# Patient Record
Sex: FEMALE | URBAN - METROPOLITAN AREA
[De-identification: names, ages, dates, MRNs, and addresses within clinical notes are randomized per-mention and may not be internally consistent; named-entity substitution may affect disease eponyms.]

---

## 2017-01-24 DIAGNOSIS — F41.8 DEPRESSION WITH ANXIETY: ICD-10-CM

## 2017-01-24 RX ORDER — SERTRALINE HYDROCHLORIDE 100 MG/1
100 TABLET, FILM COATED ORAL DAILY
Qty: 90 TAB | Refills: 1 | Status: SHIPPED | OUTPATIENT
Start: 2017-01-24 | End: 2017-03-31 | Stop reason: ALTCHOICE

## 2017-03-31 ENCOUNTER — OFFICE VISIT (OUTPATIENT)
Dept: INTERNAL MEDICINE CLINIC | Age: 37
End: 2017-03-31

## 2017-03-31 VITALS
HEART RATE: 98 BPM | WEIGHT: 148 LBS | SYSTOLIC BLOOD PRESSURE: 120 MMHG | BODY MASS INDEX: 27.23 KG/M2 | HEIGHT: 62 IN | TEMPERATURE: 99 F | RESPIRATION RATE: 16 BRPM | OXYGEN SATURATION: 98 % | DIASTOLIC BLOOD PRESSURE: 80 MMHG

## 2017-03-31 DIAGNOSIS — F41.8 OTHER SPECIFIED ANXIETY DISORDERS: Primary | ICD-10-CM

## 2017-03-31 DIAGNOSIS — Z3A.08 8 WEEKS GESTATION OF PREGNANCY: ICD-10-CM

## 2017-03-31 DIAGNOSIS — E03.9 ACQUIRED HYPOTHYROIDISM: ICD-10-CM

## 2017-03-31 RX ORDER — LANOLIN ALCOHOL/MO/W.PET/CERES
400 CREAM (GRAM) TOPICAL DAILY
COMMUNITY

## 2017-03-31 RX ORDER — SERTRALINE HYDROCHLORIDE 100 MG/1
TABLET, FILM COATED ORAL
Qty: 90 TAB | Refills: 1 | Status: SHIPPED | OUTPATIENT
Start: 2017-03-31 | End: 2017-09-08 | Stop reason: SDUPTHER

## 2017-03-31 NOTE — PROGRESS NOTES
Written by Natalie Noe, as dictated by Dr. Carlos Griffith MD.    Isauro Webb is a 31 y.o. female. HPI  The patient comes in today C/O anxiety. Since she got pregnant she has been having a lot of anxiety and it is preventing her from doing daily chores. She was taking 100 mg of Zoloft and she is 8 weeks pregnant. Her previous pregnancy she was on 200 mg of Zoloft the entire pregnancy. After the pregnancy, she cut it back to 100 mg. She is having palpitations with her anxiety and she requests a medication. She has not followed with a new psychiatrist. She has followed with ob/gyn, but she told her to follow with psychiatrist. For past 3 days she is taking 150 mg. She is not taking synthroid. She had a reaction to the synthroid and she only took 2 pills. She stopped it and after she was pregnant she followed with ob/gyn and she checked the levels and they were normal. She is going to go to [facility] for 4 months. She has been having nausea with her pregnancy but she has not been able to take any medication. Patient Active Problem List   Diagnosis Code    Hypothyroidism E03.9    PCOS (polycystic ovarian syndrome) E28.2    Vitamin D deficiency E55.9    Depression F32.9        Current Outpatient Prescriptions on File Prior to Visit   Medication Sig Dispense Refill    OMEGA-3 FATTY ACIDS/FISH OIL (OMEGA 3 FISH OIL PO) Take  by mouth.  multivitamin (ONE A DAY) tablet Take 1 Tab by mouth daily.  Lancets (ONE TOUCH ULTRASOFT LANCETS) Misc FOLLOW PACKAGE DIRECTIONS 100 Each 2    cholecalciferol, vitamin D3, (VITAMIN D3) 2,000 unit Tab Take  by mouth. No current facility-administered medications on file prior to visit.         No Known Allergies    Past Medical History:   Diagnosis Date    Depression     Diabetes mellitus ([facility] [address] 6/16/2010    Hypercholesterolemia     PCOD (polycystic ovarian disease)     Thyroid disease        Past Surgical History:   Procedure Laterality Date    HX HEENT      tonsilectomy in 1998       Family History   Problem Relation Age of Onset    Diabetes Mother     Depression Mother     Heart Disease Father     Hypertension Father     Other Brother      sinus problem    Cancer Maternal Grandmother        Social History     Social History    Marital status:      Spouse name: N/A    Number of children: N/A    Years of education: N/A     Occupational History    Not on file. Social History Main Topics    Smoking status: Never Smoker    Smokeless tobacco: Not on file    Alcohol use No    Drug use: No    Sexual activity: Not Currently      Comment:      Other Topics Concern    Not on file     Social History Narrative           Review of Systems   Constitutional: Negative for malaise/fatigue. HENT: Negative for congestion. Respiratory: Negative for cough and wheezing. Cardiovascular: Positive for palpitations. Negative for chest pain. Neurological: Negative for weakness and headaches. Psychiatric/Behavioral: Negative for depression and suicidal ideas. The patient is nervous/anxious. Visit Vitals    /80 (BP 1 Location: Right arm, BP Patient Position: Sitting)    Pulse 98    Temp 99 °F (37.2 °C)    Resp 16    Ht 5' 2\" (1.575 m)    Wt 148 lb (67.1 kg)    SpO2 98%    BMI 27.07 kg/m2     Physical Exam   Constitutional: She is oriented to person, place, and time. She appears well-nourished. No distress. HENT:   Right Ear: External ear normal.   Left Ear: External ear normal.   Mouth/Throat: Oropharynx is clear and moist.   Eyes: Conjunctivae and EOM are normal. Right eye exhibits no discharge. Left eye exhibits no discharge. Neck: Normal range of motion. Neck supple. Cardiovascular: Normal rate and regular rhythm. Pulmonary/Chest: Effort normal and breath sounds normal. She has no wheezes. Abdominal: Soft.  Bowel sounds are normal. She exhibits no distension. Lymphadenopathy:     She has no cervical adenopathy. Neurological: She is alert and oriented to person, place, and time. Skin: Skin is intact. Psychiatric: She has a normal mood and affect. Nursing note and vitals reviewed. ASSESSMENT and PLAN    ICD-10-CM ICD-9-CM    1. Other specified anxiety disorders F41.8 300.09 sertraline (ZOLOFT) 100 mg tablet      REFERRAL TO PSYCHIATRY    I will start her on 200 mg of Zoloft. I want her to follow with a psychiatrist immediately. I discussed that xanax is a class D drug, so I cannot give her ths while she is pregnant. 2. Acquired hypothyroidism E03.9 244.9 Her ob/gyn is monitoring these levels. 3. 8 weeks gestation of pregnancy Z3A.08 V22.2 Follows with ob/gyn      This plan was reviewed with the patient and patient agrees. All questions were answered. This scribe documentation was reviewed by me and accurately reflects the examination and decisions made by me. This note will not be viewable in [address]

## 2017-03-31 NOTE — PROGRESS NOTES
Called and LVM with [address] [place] [address] office for pt to be seen asap. Will f/u.

## 2017-03-31 NOTE — PROGRESS NOTES
Chief Complaint   Patient presents with    Anxiety     states that she got pregnant and her anxiety has increased. she is having difficulty doing the household chores.

## 2017-09-08 RX ORDER — SERTRALINE HYDROCHLORIDE 100 MG/1
TABLET, FILM COATED ORAL
Qty: 90 TAB | Refills: 1 | Status: SHIPPED | OUTPATIENT
Start: 2017-09-08 | End: 2017-12-18 | Stop reason: SDUPTHER

## 2017-12-18 ENCOUNTER — OFFICE VISIT (OUTPATIENT)
Dept: INTERNAL MEDICINE CLINIC | Age: 37
End: 2017-12-18

## 2017-12-18 VITALS
BODY MASS INDEX: 27.05 KG/M2 | TEMPERATURE: 98.3 F | WEIGHT: 147 LBS | SYSTOLIC BLOOD PRESSURE: 114 MMHG | DIASTOLIC BLOOD PRESSURE: 66 MMHG | OXYGEN SATURATION: 96 % | HEART RATE: 93 BPM | RESPIRATION RATE: 16 BRPM | HEIGHT: 62 IN

## 2017-12-18 DIAGNOSIS — F41.8 DEPRESSION WITH ANXIETY: ICD-10-CM

## 2017-12-18 DIAGNOSIS — I10 ESSENTIAL HYPERTENSION: Primary | ICD-10-CM

## 2017-12-18 RX ORDER — LABETALOL 100 MG/1
TABLET, FILM COATED ORAL 2 TIMES DAILY
COMMUNITY
End: 2018-02-02 | Stop reason: ALTCHOICE

## 2017-12-18 RX ORDER — SERTRALINE HYDROCHLORIDE 100 MG/1
TABLET, FILM COATED ORAL
Qty: 90 TAB | Refills: 1 | Status: SHIPPED | OUTPATIENT
Start: 2017-12-18 | End: 2018-03-20 | Stop reason: SDUPTHER

## 2017-12-18 NOTE — PROGRESS NOTES
Chief Complaint   Patient presents with    Medication Refill     needs refill on zoloft and check bp.  recently had baby who is 2 months old

## 2017-12-18 NOTE — PROGRESS NOTES
Written by Ellwood Ganser, as dictated by Dr. Arnel Hinkle MD.    Dorota Soria is a 30 y.o. female. HPI  The patient comes in today for a medication refill. She needs a refill of her Zoloft. She experienced gestational hypertension (she delivered baby girl  via  2 months ago) for which she takes labetalol, 200 mg BID. She felt very dizzy on that dose so she cut down her dose to 100 mg only at night. She would like to know if she has to continue that dose. She has been experiencing a lot of back pain. She has not been taking anything for this, but has been trying to stretch which relieves pain for few hours. She did not have any problems with her diabetes while she was pregnant. She is currently breastfeeding. Patient Active Problem List   Diagnosis Code    PCOS (polycystic ovarian syndrome) E28.2    Vitamin D deficiency E55.9    Depression F32.9        Current Outpatient Prescriptions on File Prior to Visit   Medication Sig Dispense Refill    multivitamin (ONE A DAY) tablet Take 1 Tab by mouth daily.  folic acid 332 mcg tablet Take 400 mcg by mouth daily.  OMEGA-3 FATTY ACIDS/FISH OIL (OMEGA 3 FISH OIL PO) Take  by mouth.  cholecalciferol, vitamin D3, (VITAMIN D3) 2,000 unit Tab Take  by mouth.  Lancets (ONE TOUCH ULTRASOFT LANCETS) Misc FOLLOW PACKAGE DIRECTIONS 100 Each 2     No current facility-administered medications on file prior to visit.         Past Medical History:   Diagnosis Date    Depression     Diabetes mellitus ([facility] [address] 2010    Hypercholesterolemia     PCOD (polycystic ovarian disease)     Thyroid disease        Past Surgical History:   Procedure Laterality Date    HX HEENT      tonsilectomy in        Family History   Problem Relation Age of Onset    Diabetes Mother     Depression Mother     Heart Disease Father     Hypertension Father     Other Brother      sinus problem    Cancer Maternal Grandmother        Social History     Social History    Marital status:      Spouse name: N/A    Number of children: N/A    Years of education: N/A     Occupational History    Not on file. Social History Main Topics    Smoking status: Never Smoker    Smokeless tobacco: Never Used    Alcohol use No    Drug use: No    Sexual activity: Not Currently      Comment:      Other Topics Concern    Not on file     Social History Narrative       Review of Systems   Constitutional: Negative for malaise/fatigue. HENT: Negative for congestion. Respiratory: Negative for cough and shortness of breath. Musculoskeletal: Positive for back pain. Negative for joint pain and myalgias. Neurological: Negative for weakness. Psychiatric/Behavioral: Negative for depression, memory loss and substance abuse. The patient is not nervous/anxious. Visit Vitals    /66 (BP 1 Location: Left arm, BP Patient Position: Sitting)    Pulse 93    Temp 98.3 °F (36.8 °C) (Oral)    Resp 16    Ht 5' 2\" (1.575 m)    Wt 147 lb (66.7 kg)    SpO2 96%    Breastfeeding Yes  Comment: baby born on Oct 18th    BMI 26.89 kg/m2       Physical Exam   Constitutional: She is oriented to person, place, and time. She appears well-developed and well-nourished. No distress. HENT:   Right Ear: External ear normal.   Left Ear: External ear normal.   Eyes: Conjunctivae and EOM are normal. Right eye exhibits no discharge. Left eye exhibits no discharge. Neck: Normal range of motion. Neck supple. Cardiovascular: Normal rate and regular rhythm. Pulmonary/Chest: Effort normal and breath sounds normal. She has no wheezes. Abdominal: Soft. Bowel sounds are normal. There is no tenderness. Lymphadenopathy:     She has no cervical adenopathy. Neurological: She is alert and oriented to person, place, and time. Skin: She is not diaphoretic. Psychiatric: She has a normal mood and affect.  Her behavior is normal.   Nursing note and vitals reviewed. ASSESSMENT and PLAN    ICD-10-CM ICD-9-CM    1. Essential hypertension I10 401.9 She can cut her dose in half further to 50 mg at night. She will return to have labs drawn. 2. Depression with anxiety F41.8 300.4 sertraline (ZOLOFT) 100 mg tablet sent to pharmacy    Doing well on current dose of  Zoloft. Zoloft refilled. She refuses medication for low back pain. This plan was reviewed with the patient and patient agrees. All questions were answered. This scribe documentation was reviewed by me and accurately reflects the examination and decisions made by me. This note will not be viewable in [address]

## 2017-12-18 NOTE — LETTER
December 18, 2017 Mark Reid [address] [facility] [address] Dear Kamini Purcell: Thank you for requesting access to [employer]. Please follow the instructions below to securely access and download your online medical record. [employer]Be allows you to send messages to your doctor, view your test results, renew your prescriptions, schedule appointments, and more. How Do I Sign Up? 1. In your internet browser, go to [email or web address]  
2. Click on the First Time User? Click Here link in the Sign In box. You will be redirected to the New Member Sign Up page. 3. Enter your [employer] Access Code exactly as it appears below. You will not need to use this code after youve completed the sign-up process. If you do not sign up before the expiration date, you must request a new code. UannaBe Access Code: [email or web address] Expires: 3/18/2018  1:17 PM  
 
4. Enter the last four digits of your Social Security Number (xxxx) and Date of Birth (mm/dd/yyyy) as indicated and click Submit. You will be taken to the next sign-up page. 5. Create a [employer]annaBe ID. This will be your [employer]annaBe login ID and cannot be changed, so think of one that is secure and easy to remember. 6. Create a [employer]Be password. You can change your password at any time. 7. Enter your Password Reset Question and Answer. This can be used at a later time if you forget your password. 8. Enter your e-mail address. You will receive e-mail notification when new information is available in [address] 9. Click Sign Up. You can now view and download portions of your medical record. 10. Click the Download Summary menu link to download a portable copy of your medical information. Additional Information If you have questions, please visit the Frequently Asked Questions section of the [employer]annaBe website at [email or web address] Remember, [employer]annaBe is NOT to be used for urgent needs. For medical emergencies, dial 911. Now available from your iPhone and Android! Sincerely, Orlando Roth

## 2018-01-24 DIAGNOSIS — Z00.00 PHYSICAL EXAM, ROUTINE: Primary | ICD-10-CM

## 2018-01-24 DIAGNOSIS — E55.9 VITAMIN D DEFICIENCY: ICD-10-CM

## 2018-01-24 NOTE — PROGRESS NOTES
Pt came in stating needed physical lab work done, no appt has been made. Informed pt that no labs will be placed until appt has been made which she stated she will do while in office. Lab order have been placed.

## 2018-01-25 LAB
25(OH)D3+25(OH)D2 SERPL-MCNC: 13.1 NG/ML (ref 30–100)
ALBUMIN SERPL-MCNC: 4.3 G/DL (ref 3.5–5.5)
ALBUMIN/GLOB SERPL: 1.7 {RATIO} (ref 1.2–2.2)
ALP SERPL-CCNC: 78 IU/L (ref 39–117)
ALT SERPL-CCNC: 24 IU/L (ref 0–32)
AST SERPL-CCNC: 23 IU/L (ref 0–40)
BILIRUB SERPL-MCNC: 0.5 MG/DL (ref 0–1.2)
BUN SERPL-MCNC: 12 MG/DL (ref 6–20)
BUN/CREAT SERPL: 23 (ref 9–23)
CALCIUM SERPL-MCNC: 8.8 MG/DL (ref 8.7–10.2)
CHLORIDE SERPL-SCNC: 102 MMOL/L (ref 96–106)
CHOLEST SERPL-MCNC: 229 MG/DL (ref 100–199)
CO2 SERPL-SCNC: 23 MMOL/L (ref 18–29)
CREAT SERPL-MCNC: 0.53 MG/DL (ref 0.57–1)
ERYTHROCYTE [DISTWIDTH] IN BLOOD BY AUTOMATED COUNT: 13.8 % (ref 12.3–15.4)
GFR SERPLBLD CREATININE-BSD FMLA CKD-EPI: 123 ML/MIN/1.73
GFR SERPLBLD CREATININE-BSD FMLA CKD-EPI: 142 ML/MIN/1.73
GLOBULIN SER CALC-MCNC: 2.5 G/DL (ref 1.5–4.5)
GLUCOSE SERPL-MCNC: 89 MG/DL (ref 65–99)
HCT VFR BLD AUTO: 39.8 % (ref 34–46.6)
HDLC SERPL-MCNC: 43 MG/DL
HGB BLD-MCNC: 13.5 G/DL (ref 11.1–15.9)
INTERPRETATION, 910389: NORMAL
LDLC SERPL CALC-MCNC: 150 MG/DL (ref 0–99)
MCH RBC QN AUTO: 28 PG (ref 26.6–33)
MCHC RBC AUTO-ENTMCNC: 33.9 G/DL (ref 31.5–35.7)
MCV RBC AUTO: 83 FL (ref 79–97)
PLATELET # BLD AUTO: 137 X10E3/UL (ref 150–379)
POTASSIUM SERPL-SCNC: 4.1 MMOL/L (ref 3.5–5.2)
PROT SERPL-MCNC: 6.8 G/DL (ref 6–8.5)
RBC # BLD AUTO: 4.82 X10E6/UL (ref 3.77–5.28)
SODIUM SERPL-SCNC: 140 MMOL/L (ref 134–144)
TRIGL SERPL-MCNC: 180 MG/DL (ref 0–149)
TSH SERPL DL<=0.005 MIU/L-ACNC: 3.43 UIU/ML (ref 0.45–4.5)
VLDLC SERPL CALC-MCNC: 36 MG/DL (ref 5–40)
WBC # BLD AUTO: 5.4 X10E3/UL (ref 3.4–10.8)

## 2018-02-02 ENCOUNTER — OFFICE VISIT (OUTPATIENT)
Dept: INTERNAL MEDICINE CLINIC | Age: 38
End: 2018-02-02

## 2018-02-02 VITALS
TEMPERATURE: 98.5 F | DIASTOLIC BLOOD PRESSURE: 68 MMHG | HEART RATE: 92 BPM | RESPIRATION RATE: 15 BRPM | BODY MASS INDEX: 26.13 KG/M2 | WEIGHT: 142 LBS | HEIGHT: 62 IN | SYSTOLIC BLOOD PRESSURE: 104 MMHG | OXYGEN SATURATION: 98 %

## 2018-02-02 DIAGNOSIS — F32.89 OTHER DEPRESSION: ICD-10-CM

## 2018-02-02 DIAGNOSIS — H10.13 ALLERGIC CONJUNCTIVITIS, BILATERAL: ICD-10-CM

## 2018-02-02 DIAGNOSIS — E55.9 VITAMIN D DEFICIENCY: ICD-10-CM

## 2018-02-02 DIAGNOSIS — Z00.00 PHYSICAL EXAM: ICD-10-CM

## 2018-02-02 DIAGNOSIS — E78.2 MIXED HYPERLIPIDEMIA: Primary | ICD-10-CM

## 2018-02-02 RX ORDER — KETOTIFEN FUMARATE 0.35 MG/ML
1 SOLUTION/ DROPS OPHTHALMIC 2 TIMES DAILY
Qty: 10 ML | Refills: 0 | Status: SHIPPED | OUTPATIENT
Start: 2018-02-02 | End: 2018-02-12

## 2018-02-02 NOTE — MR AVS SNAPSHOT
[address] [facility] [address] 
[phone] Patient: Melvin Zendejas MRN: [id number] :1980 Visit Information Date & Time Provider Department Dept. Phone Encounter #  
 2018  9:45 AM Debra Leung MD [facility] Internal Medicine [phone] [id number] Upcoming Health Maintenance Date Due Pneumococcal 19-64 Medium Risk (1 of 1 - PPSV23) 2001 HEMOGLOBIN A1C Q6M 2018 LIPID PANEL Q1 2019 PAP AKA CERVICAL CYTOLOGY 2020 DTaP/Tdap/Td series (2 - Td) 3/15/2027 Allergies as of 2018  Review Complete On: 2018 By: Debra Leung MD  
 No Known Allergies Current Immunizations  Never Reviewed No immunizations on file. Not reviewed this visit You Were Diagnosed With   
  
 Codes Comments Mixed hyperlipidemia    -  Primary ICD-10-CM: S82.6 ICD-9-CM: 272.2 Vitamin D deficiency     ICD-10-CM: E55.9 ICD-9-CM: 268.9 Other depression     ICD-10-CM: F32.89 ICD-9-CM: 016 Allergic conjunctivitis, bilateral     ICD-10-CM: H10.13 ICD-9-CM: 372.14 Vitals BP Pulse Temp Resp Height(growth percentile) Weight(growth percentile) 104/68 (BP 1 Location: Right arm, BP Patient Position: Sitting) 92 98.5 °F (36.9 °C) (Oral) 15 5' 2\" (1.575 m) 142 lb (64.4 kg) SpO2 Breastfeeding? BMI OB Status Smoking Status 98% Yes 25.97 kg/m2 Recent pregnancy Never Smoker BMI and BSA Data Body Mass Index Body Surface Area  
 25.97 kg/m 2 1.68 m 2 Preferred Pharmacy Pharmacy Name Phone [id number]- [place] Meza, [facility] [address] [phone] Your Updated Medication List  
  
   
This list is accurate as of: 18 10:45 AM.  Always use your most recent med list.  
  
  
  
  
 folic acid 557 mcg tablet Take 400 mcg by mouth daily. ketotifen 0.025 % (0.035 %) ophthalmic solution Commonly known as:  ZADITOR Administer 1 Drop to both eyes two (2) times a day for 10 days. Lancets Misc Commonly known as:  ONETOUCH ULTRASOFT LANCETS  
FOLLOW PACKAGE DIRECTIONS  
  
 multivitamin tablet Commonly known as:  ONE A DAY Take 1 Tab by mouth daily. OMEGA 3 FISH OIL PO Take  by mouth. sertraline 100 mg tablet Commonly known as:  ZOLOFT Take 2 tablets daily. VITAMIN D3 2,000 unit Tab Generic drug:  cholecalciferol (vitamin D3) Take  by mouth. Prescriptions Sent to Pharmacy Refills  
 ketotifen (ZADITOR) 0.025 % (0.035 %) ophthalmic solution 0 Sig: Administer 1 Drop to both eyes two (2) times a day for 10 days. Class: Normal  
 Pharmacy: [id number]- Cristina Cortez, [address] [place] [address] #: [phone] Route: Both Eyes Introducing [facility] SERVICES! Dear Delmis Vang: Thank you for requesting a [employer] account. Our records indicate that you already have an active [employer] account. You can access your account anytime at [email or web address] Did you know that you can access your hospital and ER discharge instructions at any time in Ac[employer]? You can also review all of your test results from your hospital stay or ER visit. Additional Information If you have questions, please visit the Frequently Asked Questions section of the [employer] website at [email or web address] Remember, Ac[employer]inta is NOT to be used for urgent needs. For medical emergencies, dial 911. Now available from your iPhone and Android! Please provide this summary of care documentation to your next provider. Your primary care clinician is listed as Hudson Flower. If you have any questions after today's visit, please call [phone].

## 2018-02-02 NOTE — PROGRESS NOTES
Written by Adams Grossman, as dictated by Dr. Jeanette Mclean MD.    Cherri Max is a 30 y.o. female. HPI  The patient comes in today for a physical & follow up on her labs. Labs were drawn on 01/24. Her vitamin D was low at 13.1, and she is not taking vitamin D supplements. Her TSH was normal. Her total cholesterol was high at 229, triglycerides high at 180, and LDL high at 150. All other labs were normal.    She has been feeling lightheaded and sleepy for the last couple days. She denies cough/congestion or room spinning sensations. She is no longer taking labetalol, but her BP is low at 104/68. She has also been experiencing back pain, which is worse when she sits for an extended period of time. Her eyes have been red with occasional discharge for the last couple days. She eats 3 meals per day but is not watching her diet particularly closely. She does eat mostly rice, vegetables, and meat. She has been taking Zoloft 100 mg BID. She is currently breastfeeding. Patient Active Problem List   Diagnosis Code    PCOS (polycystic ovarian syndrome) E28.2    Vitamin D deficiency E55.9    Depression F32.9        Current Outpatient Prescriptions on File Prior to Visit   Medication Sig Dispense Refill    sertraline (ZOLOFT) 100 mg tablet Take 2 tablets daily. 90 Tab 1    folic acid 288 mcg tablet Take 400 mcg by mouth daily.  OMEGA-3 FATTY ACIDS/FISH OIL (OMEGA 3 FISH OIL PO) Take  by mouth.  multivitamin (ONE A DAY) tablet Take 1 Tab by mouth daily.  cholecalciferol, vitamin D3, (VITAMIN D3) 2,000 unit Tab Take  by mouth.  Lancets (ONE TOUCH ULTRASOFT LANCETS) Misc FOLLOW PACKAGE DIRECTIONS 100 Each 2     No current facility-administered medications on file prior to visit.         Past Medical History:   Diagnosis Date    Depression     Diabetes mellitus ([facility] [address] 6/16/2010    Hypercholesterolemia     PCOD (polycystic ovarian disease)     Thyroid disease        Past Surgical History:   Procedure Laterality Date    HX HEENT      tonsilectomy in 1998       Family History   Problem Relation Age of Onset    Diabetes Mother     Depression Mother     Heart Disease Father     Hypertension Father     Other Brother      sinus problem    Cancer Maternal Grandmother        Social History     Social History    Marital status:      Spouse name: N/A    Number of children: N/A    Years of education: N/A     Occupational History    Not on file.      Social History Main Topics    Smoking status: Never Smoker    Smokeless tobacco: Never Used    Alcohol use No    Drug use: No    Sexual activity: Not Currently      Comment:      Other Topics Concern    Not on file     Social History Narrative       Orders Only on 01/24/2018   Component Date Value Ref Range Status    WBC 01/24/2018 5.4  3.4 - 10.8 x10E3/uL Final    RBC 01/24/2018 4.82  3.77 - 5.28 x10E6/uL Final    HGB 01/24/2018 13.5  11.1 - 15.9 g/dL Final    HCT 01/24/2018 39.8  34.0 - 46.6 % Final    MCV 01/24/2018 83  79 - 97 fL Final    MCH 01/24/2018 28.0  26.6 - 33.0 pg Final    MCHC 01/24/2018 33.9  31.5 - 35.7 g/dL Final    RDW 01/24/2018 13.8  12.3 - 15.4 % Final    PLATELET 11/93/1410 263* 150 - 379 x10E3/uL Final    Glucose 01/24/2018 89  65 - 99 mg/dL Final    BUN 01/24/2018 12  6 - 20 mg/dL Final    Creatinine 01/24/2018 0.53* 0.57 - 1.00 mg/dL Final    GFR est non-AA 01/24/2018 123  >59 mL/min/1.73 Final    GFR est AA 01/24/2018 142  >59 mL/min/1.73 Final    BUN/Creatinine ratio 01/24/2018 23  9 - 23 Final    Sodium 01/24/2018 140  134 - 144 mmol/L Final    Potassium 01/24/2018 4.1  3.5 - 5.2 mmol/L Final    Chloride 01/24/2018 102  96 - 106 mmol/L Final    CO2 01/24/2018 23  18 - 29 mmol/L Final    Calcium 01/24/2018 8.8  8.7 - 10.2 mg/dL Final    Protein, total 01/24/2018 6.8  6.0 - 8.5 g/dL Final    Albumin 01/24/2018 4.3  3.5 - 5.5 g/dL Final    GLOBULIN, TOTAL 01/24/2018 2.5  1.5 - 4.5 g/dL Final    A-G Ratio 01/24/2018 1.7  1.2 - 2.2 Final    Bilirubin, total 01/24/2018 0.5  0.0 - 1.2 mg/dL Final    Alk. phosphatase 01/24/2018 78  39 - 117 IU/L Final    AST (SGOT) 01/24/2018 23  0 - 40 IU/L Final    ALT (SGPT) 01/24/2018 24  0 - 32 IU/L Final    Cholesterol, total 01/24/2018 229* 100 - 199 mg/dL Final    Triglyceride 01/24/2018 180* 0 - 149 mg/dL Final    HDL Cholesterol 01/24/2018 43  >39 mg/dL Final    VLDL, calculated 01/24/2018 36  5 - 40 mg/dL Final    LDL, calculated 01/24/2018 150* 0 - 99 mg/dL Final    TSH 01/24/2018 3.430  0.450 - 4.500 uIU/mL Final    VITAMIN D, 25-HYDROXY 01/24/2018 13.1* 30.0 - 100.0 ng/mL Final       Review of Systems   Constitutional: Positive for malaise/fatigue. HENT: Negative for congestion. Eyes: Positive for discharge and redness. Negative for blurred vision and pain. Respiratory: Negative for cough and shortness of breath. Musculoskeletal: Positive for back pain. Negative for joint pain and myalgias. Neurological: Positive for dizziness. Negative for tingling, sensory change, weakness and headaches. Endo/Heme/Allergies: Positive for environmental allergies. Psychiatric/Behavioral: Negative for depression, memory loss and substance abuse. Visit Vitals    /68 (BP 1 Location: Right arm, BP Patient Position: Sitting)    Pulse 92    Temp 98.5 °F (36.9 °C) (Oral)    Resp 15    Ht 5' 2\" (1.575 m)    Wt 142 lb (64.4 kg)    SpO2 98%    Breastfeeding Yes    BMI 25.97 kg/m2       Physical Exam   Constitutional: She is oriented to person, place, and time. She appears well-developed and well-nourished. No distress. HENT:   Right Ear: External ear normal.   Left Ear: External ear normal.   Eyes: Conjunctivae and EOM are normal.   + watery discharge from both eyes. Neck: Normal range of motion. Neck supple. Cardiovascular: Normal rate and regular rhythm. Pulmonary/Chest: Effort normal and breath sounds normal. She has no wheezes. Abdominal: Soft. Bowel sounds are normal. There is no tenderness. Lymphadenopathy:     She has no cervical adenopathy. Neurological: She is alert and oriented to person, place, and time. Psychiatric: She has a normal mood and affect. Her behavior is normal.   Nursing note and vitals reviewed. ASSESSMENT and PLAN    ICD-10-CM ICD-9-CM    1. Mixed hyperlipidemia E78.2 272.2 Since she is breastfeeding we cannot start her on any medication for cholesterol at this time. Recommended starting to walk/other light exercise, though I did advise that since lactic acid buildup can affect the taste of her breast milk, she should drink plenty of water before and after she exercises. 2. Vitamin D deficiency E55.9 268.9 Recommended 1000 iu daily vitamin D.   3. Other depression F32.89 311 She can reduce to 1.5 tablets Zoloft 100 mg for the next few weeks, then she can decrease further to 1 tablet if she feels ready. Will discuss further on next  visit. 4. Allergic conjunctivitis, bilateral H10.13 372.14 ketotifen (ZADITOR) 0.025 % (0.035 %) ophthalmic solution sent to pharmacy    Zaditor eye drops given. 5.        Physical exam                                                                           Physical exam done. Labs reviewed. For her dizziness, I recommended keeping hydrated especially as she is breastfeeding. This plan was reviewed with the patient and patient agrees. All questions were answered. This scribe documentation was reviewed by me and accurately reflects the examination and decisions made by me. This note will not be viewable in [address]

## 2018-02-02 NOTE — PROGRESS NOTES
Chief Complaint   Patient presents with    Complete Physical     complete physical with labs and states that she is feeling very fatigued and is still having brown discharge.

## 2018-03-20 DIAGNOSIS — F41.8 DEPRESSION WITH ANXIETY: ICD-10-CM

## 2018-03-20 RX ORDER — SERTRALINE HYDROCHLORIDE 100 MG/1
TABLET, FILM COATED ORAL
Qty: 90 TAB | Refills: 1 | Status: SHIPPED | OUTPATIENT
Start: 2018-03-20 | End: 2018-07-02 | Stop reason: SDUPTHER

## 2018-07-02 DIAGNOSIS — F41.8 DEPRESSION WITH ANXIETY: ICD-10-CM

## 2018-07-02 RX ORDER — SERTRALINE HYDROCHLORIDE 100 MG/1
TABLET, FILM COATED ORAL
Qty: 90 TAB | Refills: 1 | Status: SHIPPED | OUTPATIENT
Start: 2018-07-02 | End: 2018-10-17 | Stop reason: SDUPTHER

## 2018-07-18 ENCOUNTER — OFFICE VISIT (OUTPATIENT)
Dept: INTERNAL MEDICINE CLINIC | Age: 38
End: 2018-07-18

## 2018-07-18 VITALS
TEMPERATURE: 98.6 F | DIASTOLIC BLOOD PRESSURE: 70 MMHG | RESPIRATION RATE: 16 BRPM | BODY MASS INDEX: 27.23 KG/M2 | SYSTOLIC BLOOD PRESSURE: 108 MMHG | OXYGEN SATURATION: 98 % | WEIGHT: 148 LBS | HEART RATE: 87 BPM | HEIGHT: 62 IN

## 2018-07-18 DIAGNOSIS — E55.9 VITAMIN D DEFICIENCY: ICD-10-CM

## 2018-07-18 DIAGNOSIS — E78.5 HYPERLIPIDEMIA, UNSPECIFIED HYPERLIPIDEMIA TYPE: ICD-10-CM

## 2018-07-18 DIAGNOSIS — G47.61 PERIODIC LIMB MOVEMENT: Primary | ICD-10-CM

## 2018-07-18 DIAGNOSIS — E66.3 OVERWEIGHT (BMI 25.0-29.9): ICD-10-CM

## 2018-07-18 DIAGNOSIS — R20.0 NUMBNESS: ICD-10-CM

## 2018-07-18 DIAGNOSIS — Z86.32 H/O GESTATIONAL DIABETES MELLITUS, NOT CURRENTLY PREGNANT: ICD-10-CM

## 2018-07-18 NOTE — MR AVS SNAPSHOT
[address] [facility] [address] [place] [address] [address] 
[phone] Patient: Simon Velasco MRN: [id number] :1980 Visit Information Date & Time Provider Department Dept. Phone Encounter #  
 2018  9:00 AM Solomon Leos NP [facility] [phone] [id number] Follow-up Instructions Return in about 2 weeks (around 2018), or if symptoms worsen or fail to improve. Upcoming Health Maintenance Date Due Pneumococcal 19-64 Medium Risk (1 of 1 - PPSV23) 2001 HEMOGLOBIN A1C Q6M 2018 Influenza Age 5 to Adult 2018 LIPID PANEL Q1 2019 PAP AKA CERVICAL CYTOLOGY 2020 DTaP/Tdap/Td series (2 - Td) 3/15/2027 Allergies as of 2018  Review Complete On: 2018 By: Adriel Newton LPN No Known Allergies Current Immunizations  Never Reviewed No immunizations on file. Not reviewed this visit You Were Diagnosed With   
  
 Codes Comments Periodic limb movement    -  Primary ICD-10-CM: G47.61 ICD-9-CM: 327.51 Numbness     ICD-10-CM: R20.0 ICD-9-CM: 809. 0 Vitamin D deficiency     ICD-10-CM: E55.9 ICD-9-CM: 268.9 Hyperlipidemia, unspecified hyperlipidemia type     ICD-10-CM: E78.5 ICD-9-CM: 272.4 H/O gestational diabetes mellitus, not currently pregnant     ICD-10-CM: Z86.32 
ICD-9-CM: V12.21 Vitals BP Pulse Temp Resp Height(growth percentile) Weight(growth percentile) 108/70 (BP 1 Location: Right arm, BP Patient Position: Sitting) 87 98.6 °F (37 °C) (Oral) 16 5' 2\" (1.575 m) 148 lb (67.1 kg) SpO2 BMI OB Status Smoking Status 98% 27.07 kg/m2 Unknown Never Smoker Vitals History BMI and BSA Data Body Mass Index Body Surface Area  
 27.07 kg/m 2 1.71 m 2 Preferred Pharmacy Pharmacy Name Phone [facility][id number]- [place] Leon, [address] [place] [address] [phone] Your Updated Medication List  
  
   
This list is accurate as of 7/18/18  9:50 AM.  Always use your most recent med list.  
  
  
  
  
 folic acid 070 mcg tablet Take 400 mcg by mouth daily. Lancets Misc Commonly known as:  ONETOUCH ULTRASOFT LANCETS  
FOLLOW PACKAGE DIRECTIONS  
  
 multivitamin tablet Commonly known as:  ONE A DAY Take 1 Tab by mouth daily. OMEGA 3 FISH OIL PO Take  by mouth. sertraline 100 mg tablet Commonly known as:  ZOLOFT Take 2 tablets daily. VITAMIN D3 2,000 unit Tab Generic drug:  cholecalciferol (vitamin D3) Take  by mouth. We Performed the Following CBC WITH AUTOMATED DIFF [33106 CPT(R)] HEMOGLOBIN A1C WITH EAG [43283 CPT(R)] LIPID PANEL [03021 CPT(R)] METABOLIC PANEL, COMPREHENSIVE [22416 CPT(R)] VITAMIN B12 & FOLATE [54839 CPT(R)] VITAMIN D, 25 HYDROXY [id number] CPT(R)] Follow-up Instructions Return in about 2 weeks (around 8/1/2018), or if symptoms worsen or fail to improve. Patient Instructions A Healthy Lifestyle: Care Instructions Your Care Instructions A healthy lifestyle can help you feel good, stay at a healthy weight, and have plenty of energy for both work and play. A healthy lifestyle is something you can share with your whole family. A healthy lifestyle also can lower your risk for serious health problems, such as high blood pressure, heart disease, and diabetes. You can follow a few steps listed below to improve your health and the health of your family. Follow-up care is a key part of your treatment and safety. Be sure to make and go to all appointments, and call your doctor if you are having problems. It's also a good idea to know your test results and keep a list of the medicines you take. How can you care for yourself at home? · Do not eat too much sugar, fat, or fast foods. You can still have dessert and treats now and then. The goal is moderation. · Start small to improve your eating habits. Pay attention to portion sizes, drink less juice and soda pop, and eat more fruits and vegetables. ¨ Eat a healthy amount of food. A 3-ounce serving of meat, for example, is about the size of a deck of cards. Fill the rest of your plate with vegetables and whole grains. ¨ Limit the amount of soda and sports drinks you have every day. Drink more water when you are thirsty. ¨ Eat at least 5 servings of fruits and vegetables every day. It may seem like a lot, but it is not hard to reach this goal. A serving or helping is 1 piece of fruit, 1 cup of vegetables, or 2 cups of leafy, raw vegetables. Have an apple or some carrot sticks as an afternoon snack instead of a candy bar. Try to have fruits and/or vegetables at every meal. 
· Make exercise part of your daily routine. You may want to start with simple activities, such as walking, bicycling, or slow swimming. Try to be active 30 to 60 minutes every day. You do not need to do all 30 to 60 minutes all at once. For example, you can exercise 3 times a day for 10 or 20 minutes. Moderate exercise is safe for most people, but it is always a good idea to talk to your doctor before starting an exercise program. 
· Keep moving. Orville Woodard the lawn, work in the garden, or [email or web address]. Take the stairs instead of the elevator at work. · If you smoke, quit. People who smoke have an increased risk for heart attack, stroke, cancer, and other lung illnesses. Quitting is hard, but there are ways to boost your chance of quitting tobacco for good. ¨ Use nicotine gum, patches, or lozenges. ¨ Ask your doctor about stop-smoking programs and medicines. ¨ Keep trying.  
In addition to reducing your risk of diseases in the future, you will notice some benefits soon after you stop using tobacco. If you have shortness of breath or asthma symptoms, they will likely get better within a few weeks after you quit. · Limit how much alcohol you drink. Moderate amounts of alcohol (up to 2 drinks a day for men, 1 drink a day for women) are okay. But drinking too much can lead to liver problems, high blood pressure, and other health problems. Family health If you have a family, there are many things you can do together to improve your health. · Eat meals together as a family as often as possible. · Eat healthy foods. This includes fruits, vegetables, lean meats and dairy, and whole grains. · Include your family in your fitness plan. Most people think of activities such as jogging or tennis as the way to fitness, but there are many ways you and your family can be more active. Anything that makes you breathe hard and gets your heart pumping is exercise. Here are some tips: 
¨ Walk to do errands or to take your child to school or the bus. ¨ Go for a family bike ride after dinner instead of watching TV. Where can you learn more? Go to http://abelardo-rene.info/. Enter O123 in the search box to learn more about \"A Healthy Lifestyle: Care Instructions. \" Current as of: December 7, 2017 Content Version: 11.7 © 1243-7345 [employer]. Care instructions adapted under license by [employer] (which disclaims liability or warranty for this information). If you have questions about a medical condition or this instruction, always ask your healthcare professional. [place] [address] any warranty or liability for your use of this information. Introducing \[facility]\"" & HEALTH SERVICES! Dear Gilda Kussmaul: Thank you for requesting a Skillz account. Our records indicate that you already have an active Skillz account. You can access your account anytime at [email or web address] Did you know that you can access your hospital and ER discharge instructions at any time in CamSemi? You can also review all of your test results from your hospital stay or ER visit. Additional Information If you have questions, please visit the Frequently Asked Questions section of the CamSemi website at [email or web address] Remember, CamSemi is NOT to be used for urgent needs. For medical emergencies, dial 911. Now available from your iPhone and Android! Please provide this summary of care documentation to your next provider. Your primary care clinician is listed as Maryanne Leblanc. If you have any questions after today's visit, please call [phone].

## 2018-07-18 NOTE — PROGRESS NOTES
This note will not be viewable in [address] Robert Sullivan is a  30 y.o. female presents for visit. Leg movements, intermittent hand swelling and lab work    Chief Complaint   Patient presents with    Hand Pain     states that upon waking she has swelling and difficulty closing both hands but swelling is only on right hand. difficulty sleeping due to restless leg. this is getting to last over the last two weeks. increasing weight and eating too many sweets. HPI  Patient reports spontaneous periodic limb movements that began while she was pregnant last year. After delivery they stopped. For the past 2 weeks her legs are moving while she is falling asleep. Denies these movements when asleep. Denies pain or discomfort. Reports some intermittent swelling and numbness in her hands and weight gain. She reports a history of gestational diabetes. Patient is breast-feeding her 8month-old baby. Patient is requesting lab work today. ROS     See HPI for pertinent positives and negatives. Visit Vitals    /70 (BP 1 Location: Right arm, BP Patient Position: Sitting)    Pulse 87    Temp 98.6 °F (37 °C) (Oral)    Resp 16    Ht 5' 2\" (1.575 m)    Wt 148 lb (67.1 kg)    SpO2 98%    BMI 27.07 kg/m2     Physical Exam   Constitutional: She is oriented to person, place, and time. She appears well-nourished. No distress. HENT:   Right Ear: External ear normal.   Left Ear: External ear normal.   Mouth/Throat: Oropharynx is clear and moist.   Eyes: Conjunctivae and EOM are normal.   Neck: Normal range of motion. Neck supple. Cardiovascular: Normal rate and regular rhythm. Pulmonary/Chest: Effort normal and breath sounds normal. She has no wheezes. Abdominal: Soft. Bowel sounds are normal.   Musculoskeletal: She exhibits no edema. Neurological: She is alert and oriented to person, place, and time. Skin: Skin is warm, dry and intact.    Psychiatric: Her speech is normal and behavior is normal. Her mood appears anxious. Nursing note and vitals reviewed. Patient Active Problem List    Diagnosis Date Noted    Depression 04/18/2012    PCOS (polycystic ovarian syndrome) 12/15/2010    Vitamin D deficiency 12/15/2010         ASSESSMENT AND PLAN:      ICD-10-CM ICD-9-CM   1. Periodic limb movement G47.61 327.51   2. Numbness R20.0 782.0   3. Vitamin D deficiency E55.9 268.9   4. Hyperlipidemia, unspecified hyperlipidemia type E78.5 272.4   5. Overweight (BMI 25.0-29. 9) E66.3 278.02   6. H/O gestational diabetes mellitus, not currently pregnant Z86.32 V12.21     Orders Placed This Encounter    HEMOGLOBIN A1C WITH EAG    CBC WITH AUTOMATED DIFF    METABOLIC PANEL, COMPREHENSIVE    LIPID PANEL    VITAMIN D, 25 HYDROXY    VITAMIN B12 & FOLATE    TSH REFLEX TO T4    TSH REFLEX TO T4    CVD REPORT     Diagnoses and all orders for this visit:    1. Periodic limb movement  -     CBC WITH AUTOMATED DIFF  -     METABOLIC PANEL, COMPREHENSIVE    2. Numbness  -     HEMOGLOBIN A1C WITH EAG  -     CBC WITH AUTOMATED DIFF  -     METABOLIC PANEL, COMPREHENSIVE  -     VITAMIN B12 & FOLATE    3. Vitamin D deficiency  -     VITAMIN D, 25 HYDROXY    4. Hyperlipidemia, unspecified hyperlipidemia type  -     CBC WITH AUTOMATED DIFF  -     METABOLIC PANEL, COMPREHENSIVE  -     LIPID PANEL    5. Overweight (BMI 25.0-29.9)  -     TSH REFLEX TO T4    6. H/O gestational diabetes mellitus, not currently pregnant  -     HEMOGLOBIN A1C WITH EAG  -     CBC WITH AUTOMATED DIFF    Other orders  -     TSH REFLEX TO T4  -     CVD REPORT        lab results and schedule of future lab studies reviewed with patient  reviewed diet, exercise and weight control    Advised patient to keep track of her spontaneous limb movements and bring log to follow-up appointment. Continue current treatment pending laboratory data results.     Follow-up Disposition:  Return in about 2 weeks (around 8/1/2018), or if symptoms worsen or fail to improve. Disclaimer:  Advised her to call back or return to office if symptoms worsen/change/persist.  Discussed expected course/resolution/complications of diagnosis in detail with patient. Medication risks/benefits/alternatives discussed with patient. She was given an after visit summary which includes diagnoses, current medications, & vitals. Discussed patient instructions and advised to read to all patient instructions regarding care. She expressed understanding with the diagnosis and plan.

## 2018-07-18 NOTE — PATIENT INSTRUCTIONS

## 2018-07-18 NOTE — PROGRESS NOTES
Chief Complaint   Patient presents with    Hand Pain     states that upon waking she has swelling and difficulty closing both hands but swelling is only on right hand. difficulty sleeping due to restless leg. this is getting to last over the last two weeks. increasing weight and eating too many sweets.

## 2018-07-19 LAB
25(OH)D3+25(OH)D2 SERPL-MCNC: 14.5 NG/ML (ref 30–100)
ALBUMIN SERPL-MCNC: 4.2 G/DL (ref 3.5–5.5)
ALBUMIN/GLOB SERPL: 1.7 {RATIO} (ref 1.2–2.2)
ALP SERPL-CCNC: 81 IU/L (ref 39–117)
ALT SERPL-CCNC: 27 IU/L (ref 0–32)
AST SERPL-CCNC: 22 IU/L (ref 0–40)
BASOPHILS # BLD AUTO: 0 X10E3/UL (ref 0–0.2)
BASOPHILS NFR BLD AUTO: 1 %
BILIRUB SERPL-MCNC: 0.5 MG/DL (ref 0–1.2)
BUN SERPL-MCNC: 10 MG/DL (ref 6–20)
BUN/CREAT SERPL: 19 (ref 9–23)
CALCIUM SERPL-MCNC: 8.8 MG/DL (ref 8.7–10.2)
CHLORIDE SERPL-SCNC: 100 MMOL/L (ref 96–106)
CHOLEST SERPL-MCNC: 201 MG/DL (ref 100–199)
CO2 SERPL-SCNC: 22 MMOL/L (ref 20–29)
CREAT SERPL-MCNC: 0.52 MG/DL (ref 0.57–1)
EOSINOPHIL # BLD AUTO: 0.3 X10E3/UL (ref 0–0.4)
EOSINOPHIL NFR BLD AUTO: 5 %
ERYTHROCYTE [DISTWIDTH] IN BLOOD BY AUTOMATED COUNT: 14.4 % (ref 12.3–15.4)
EST. AVERAGE GLUCOSE BLD GHB EST-MCNC: 105 MG/DL
FOLATE SERPL-MCNC: 13.8 NG/ML
GLOBULIN SER CALC-MCNC: 2.5 G/DL (ref 1.5–4.5)
GLUCOSE SERPL-MCNC: 83 MG/DL (ref 65–99)
HBA1C MFR BLD: 5.3 % (ref 4.8–5.6)
HCT VFR BLD AUTO: 40.1 % (ref 34–46.6)
HDLC SERPL-MCNC: 47 MG/DL
HGB BLD-MCNC: 13.5 G/DL (ref 11.1–15.9)
IMM GRANULOCYTES # BLD: 0 X10E3/UL (ref 0–0.1)
IMM GRANULOCYTES NFR BLD: 0 %
INTERPRETATION, 910389: NORMAL
LDLC SERPL CALC-MCNC: 118 MG/DL (ref 0–99)
LYMPHOCYTES # BLD AUTO: 2 X10E3/UL (ref 0.7–3.1)
LYMPHOCYTES NFR BLD AUTO: 33 %
MCH RBC QN AUTO: 27.3 PG (ref 26.6–33)
MCHC RBC AUTO-ENTMCNC: 33.7 G/DL (ref 31.5–35.7)
MCV RBC AUTO: 81 FL (ref 79–97)
MONOCYTES # BLD AUTO: 0.3 X10E3/UL (ref 0.1–0.9)
MONOCYTES NFR BLD AUTO: 5 %
NEUTROPHILS # BLD AUTO: 3.4 X10E3/UL (ref 1.4–7)
NEUTROPHILS NFR BLD AUTO: 56 %
PLATELET # BLD AUTO: 143 X10E3/UL (ref 150–379)
POTASSIUM SERPL-SCNC: 4.1 MMOL/L (ref 3.5–5.2)
PROT SERPL-MCNC: 6.7 G/DL (ref 6–8.5)
RBC # BLD AUTO: 4.95 X10E6/UL (ref 3.77–5.28)
SODIUM SERPL-SCNC: 138 MMOL/L (ref 134–144)
TRIGL SERPL-MCNC: 179 MG/DL (ref 0–149)
TSH SERPL DL<=0.005 MIU/L-ACNC: 3.96 UIU/ML (ref 0.45–4.5)
VIT B12 SERPL-MCNC: 544 PG/ML (ref 232–1245)
VLDLC SERPL CALC-MCNC: 36 MG/DL (ref 5–40)
WBC # BLD AUTO: 6.1 X10E3/UL (ref 3.4–10.8)

## 2018-07-27 ENCOUNTER — OFFICE VISIT (OUTPATIENT)
Dept: INTERNAL MEDICINE CLINIC | Age: 38
End: 2018-07-27

## 2018-07-27 VITALS
OXYGEN SATURATION: 99 % | DIASTOLIC BLOOD PRESSURE: 80 MMHG | BODY MASS INDEX: 27.49 KG/M2 | TEMPERATURE: 99.2 F | HEART RATE: 93 BPM | SYSTOLIC BLOOD PRESSURE: 118 MMHG | WEIGHT: 149.4 LBS | HEIGHT: 62 IN

## 2018-07-27 DIAGNOSIS — E55.9 VITAMIN D DEFICIENCY: ICD-10-CM

## 2018-07-27 DIAGNOSIS — R20.2 TINGLING IN EXTREMITIES: ICD-10-CM

## 2018-07-27 DIAGNOSIS — G25.81 RLS (RESTLESS LEGS SYNDROME): Primary | ICD-10-CM

## 2018-07-27 DIAGNOSIS — M25.60 MORNING STIFFNESS OF JOINTS: ICD-10-CM

## 2018-07-27 NOTE — PROGRESS NOTES
Written by Jaleesa Brito, as dictated by Dr. Nallely Hall MD. 
 
[address] [facility] Simon Velasco is a 30 y.o. female. HPI The patient presents today to discuss labs drawn on 07/18: Her vitamin D was low at 14.5. Her cholesterol was still high, but has improved somewhat: total cholesterol 201, triglyceride 179, and . She reports stiffness, numbness, and joint pains in her hands in the morning for a few minutes. Before she delivered her 9 month old, she had stopped experiencing restless leg syndrome. Recently, she has begun experiencing the sxs again. She reports that she is very tired during the day and she is not sleeping well. For the past 4 days, she has been walking and noticed a small improvement in her sleep quality. She reports that she has a good appetite and has been eating a lot, and she has been eating sweets. She is still taking Zoloft 200 mg per day. She tried decreasing to 150 mg per day as per recommendation, but 150 mg was not a high enough dose for her. She is currently breastfeeding and she is taking prenatal vitamins. She plans to continue breastfeeding for 6 more months. She is taking a multivitamin with vitamin B12 and 1,000 iu vitamin D. Her vitamin D was previously 8. She reports that she has some stress at home, as her children at 11years old, 3years old, and 5 months old. She has been dealing with her middle child acting out against her youngest, which has been stressful for her. Patient Active Problem List  
Diagnosis Code  PCOS (polycystic ovarian syndrome) E28.2  Vitamin D deficiency E55.9  Depression F32.9 Current Outpatient Prescriptions on File Prior to Visit Medication Sig Dispense Refill  sertraline (ZOLOFT) 100 mg tablet Take 2 tablets daily. (Patient taking differently: Take 2 tablets qhs) 90 Tab 1  
 multivitamin (ONE A DAY) tablet Take 1 Tab by mouth daily.     
 folic acid 600 mcg tablet Take 400 mcg by mouth daily.  OMEGA-3 FATTY ACIDS/FISH OIL (OMEGA 3 FISH OIL PO) Take  by mouth.  cholecalciferol, vitamin D3, (VITAMIN D3) 2,000 unit Tab Take  by mouth.  Lancets (ONE TOUCH ULTRASOFT LANCETS) Misc FOLLOW PACKAGE DIRECTIONS 100 Each 2 No current facility-administered medications on file prior to visit. Past Medical History:  
Diagnosis Date  Depression  Diabetes mellitus ([address] 6/16/2010  Hypercholesterolemia  PCOD (polycystic ovarian disease)  Thyroid disease Past Surgical History:  
Procedure Laterality Date  HX HEENT    
 tonsilectomy in 1998 Family History Problem Relation Age of Onset  Diabetes Mother  Depression Mother  Heart Disease Father  Hypertension Father  Other Brother   
  sinus problem  Cancer Maternal Grandmother Social History Social History  Marital status:  Spouse name: N/A  
 Number of children: N/A  
 Years of education: N/A Occupational History  Not on file. Social History Main Topics  Smoking status: Never Smoker  Smokeless tobacco: Never Used  Alcohol use No  
 Drug use: No  
 Sexual activity: Not Currently Comment:  Other Topics Concern  Not on file Social History Narrative Office Visit on 07/18/2018 Component Date Value Ref Range Status  Hemoglobin A1c 07/18/2018 5.3  4.8 - 5.6 % Final  
 Estimated average glucose 07/18/2018 105  mg/dL Final  
 WBC 07/18/2018 6.1  3.4 - 10.8 x10E3/uL Final  
 RBC 07/18/2018 4.95  3.77 - 5.28 x10E6/uL Final  
 HGB 07/18/2018 13.5  11.1 - 15.9 g/dL Final  
 HCT 07/18/2018 40.1  34.0 - 46.6 % Final  
 MCV 07/18/2018 81  79 - 97 fL Final  
 MCH 07/18/2018 27.3  26.6 - 33.0 pg Final  
 MCHC 07/18/2018 33.7  31.5 - 35.7 g/dL Final  
 RDW 07/18/2018 14.4  12.3 - 15.4 % Final  
 PLATELET 18/66/6777 480* 150 - 379 x10E3/uL Final  
 NEUTROPHILS 07/18/2018 56  Not Estab. % Final  
 Lymphocytes 07/18/2018 33  Not Estab. % Final  
 MONOCYTES 07/18/2018 5  Not Estab. % Final  
 EOSINOPHILS 07/18/2018 5  Not Estab. % Final  
 BASOPHILS 07/18/2018 1  Not Estab. % Final  
 ABS. NEUTROPHILS 07/18/2018 3.4  1.4 - 7.0 x10E3/uL Final  
 Abs Lymphocytes 07/18/2018 2.0  0.7 - 3.1 x10E3/uL Final  
 ABS. MONOCYTES 07/18/2018 0.3  0.1 - 0.9 x10E3/uL Final  
 ABS. EOSINOPHILS 07/18/2018 0.3  0.0 - 0.4 x10E3/uL Final  
 ABS. BASOPHILS 07/18/2018 0.0  0.0 - 0.2 x10E3/uL Final  
 IMMATURE GRANULOCYTES 07/18/2018 0  Not Estab. % Final  
 ABS. IMM. GRANS. 07/18/2018 0.0  0.0 - 0.1 x10E3/uL Final  
 Glucose 07/18/2018 83  65 - 99 mg/dL Final  
 BUN 07/18/2018 10  6 - 20 mg/dL Final  
 Creatinine 07/18/2018 0.52* 0.57 - 1.00 mg/dL Final  
 GFR est non-AA 07/18/2018 124  >59 mL/min/1.73 Final  
 GFR est AA 07/18/2018 143  >59 mL/min/1.73 Final  
 BUN/Creatinine ratio 07/18/2018 19  9 - 23 Final  
 Sodium 07/18/2018 138  134 - 144 mmol/L Final  
 Potassium 07/18/2018 4.1  3.5 - 5.2 mmol/L Final  
 Chloride 07/18/2018 100  96 - 106 mmol/L Final  
 CO2 07/18/2018 22  20 - 29 mmol/L Final  
 Calcium 07/18/2018 8.8  8.7 - 10.2 mg/dL Final  
 Protein, total 07/18/2018 6.7  6.0 - 8.5 g/dL Final  
 Albumin 07/18/2018 4.2  3.5 - 5.5 g/dL Final  
 GLOBULIN, TOTAL 07/18/2018 2.5  1.5 - 4.5 g/dL Final  
 A-G Ratio 07/18/2018 1.7  1.2 - 2.2 Final  
 Bilirubin, total 07/18/2018 0.5  0.0 - 1.2 mg/dL Final  
 Alk.  phosphatase 07/18/2018 81  39 - 117 IU/L Final  
 AST (SGOT) 07/18/2018 22  0 - 40 IU/L Final  
 ALT (SGPT) 07/18/2018 27  0 - 32 IU/L Final  
 Cholesterol, total 07/18/2018 201* 100 - 199 mg/dL Final  
 Triglyceride 07/18/2018 179* 0 - 149 mg/dL Final  
 HDL Cholesterol 07/18/2018 47  >39 mg/dL Final  
 VLDL, calculated 07/18/2018 36  5 - 40 mg/dL Final  
 LDL, calculated 07/18/2018 118* 0 - 99 mg/dL Final  
 VITAMIN D, 25-HYDROXY 07/18/2018 14.5* 30.0 - 100.0 ng/mL Final  
 Vitamin B12 07/18/2018 544  232 - 1245 pg/mL Final  
 Folate 07/18/2018 13.8  >3.0 ng/mL Final  
 TSH 07/18/2018 3.960  0.450 - 4.500 uIU/mL Final  
 
 
Review of Systems Constitutional: Positive for malaise/fatigue. Musculoskeletal: Positive for joint pain. Negative for myalgias. Neurological: Positive for tingling. Negative for dizziness, sensory change, weakness and headaches. Psychiatric/Behavioral: Positive for depression. Negative for memory loss and substance abuse. The patient has insomnia. Visit Vitals  /80 (BP 1 Location: Right arm, BP Patient Position: Sitting)  Pulse 93  Temp 99.2 °F (37.3 °C) (Oral)  Ht 5' 2\" (1.575 m)  Wt 149 lb 6.4 oz (67.8 kg)  LMP 06/10/2018  SpO2 99%  BMI 27.33 kg/m2 Physical Exam  
Constitutional: She is oriented to person, place, and time. She appears well-developed and well-nourished. No distress. HENT:  
Right Ear: External ear normal.  
Left Ear: External ear normal.  
Eyes: Conjunctivae and EOM are normal.  
Neck: Normal range of motion. Neck supple. Cardiovascular: Normal rate and regular rhythm. Pulmonary/Chest: Effort normal and breath sounds normal.  
Abdominal: Soft. Bowel sounds are normal.  
Neurological: She is alert and oriented to person, place, and time. Psychiatric: She has a normal mood and affect. Her behavior is normal.  
Nursing note and vitals reviewed. ASSESSMENT and PLAN 
  ICD-10-CM ICD-9-CM 1. RLS (restless legs syndrome) G25.81 333.94 No treatment given at this times, as she is breastfeeding. 2. Tingling in extremities R20.2 782.0 All blood work came back normal. Recommended daily walk, exercise & staying hydrated. 3. Morning stiffness of joints M25.60 719.50 She should continue taking her multivitamin. Rheumatoid Arthritis work up once she finishes breast feeding.   
4. Vitamin D deficiency E55.9 268.9 In addition to her multivitamin, she should take another OTC 1,000 iu vitamin D.  
 This plan was reviewed with the patient and patient agrees. All questions were answered. This scribe documentation was reviewed by me and accurately reflects the examination and decisions made by me. This note will not be viewable in [address]

## 2018-07-27 NOTE — MR AVS SNAPSHOT
[address] [facility] [address] [facility] 
[phone] Patient: Mya Nixon MRN: [id number] :1980 Visit Information Date & Time Provider Department Dept. Phone Encounter #  
 2018  3:45 PM Riddhi Esteban MD [facility] [phone] [id number] Upcoming Health Maintenance Date Due Pneumococcal 19-64 Medium Risk (1 of 1 - PPSV23) 2001 Influenza Age 5 to Adult 2018 HEMOGLOBIN A1C Q6M 2019 LIPID PANEL Q1 2019 PAP AKA CERVICAL CYTOLOGY 2020 DTaP/Tdap/Td series (2 - Td) 3/15/2027 Allergies as of 2018  Review Complete On: 2018 By: Malini Sheehan LPN No Known Allergies Current Immunizations  Never Reviewed No immunizations on file. Not reviewed this visit You Were Diagnosed With   
  
 Codes Comments RLS (restless legs syndrome)    -  Primary ICD-10-CM: G25.81 ICD-9-CM: 333.94 Tingling in extremities     ICD-10-CM: R20.2 ICD-9-CM: 782.0 Morning stiffness of joints     ICD-10-CM: M25.60 ICD-9-CM: 719.50 Vitamin D deficiency     ICD-10-CM: E55.9 ICD-9-CM: 268.9 Vitals BP Pulse Temp Height(growth percentile) Weight(growth percentile) LMP  
 118/80 (BP 1 Location: Right arm, BP Patient Position: Sitting) 93 99.2 °F (37.3 °C) (Oral) 5' 2\" (1.575 m) 149 lb 6.4 oz (67.8 kg) 06/10/2018 SpO2 BMI OB Status Smoking Status 99% 27.33 kg/m2 Unknown Never Smoker BMI and BSA Data Body Mass Index Body Surface Area  
 27.33 kg/m 2 1.72 m 2 Preferred Pharmacy Pharmacy Name Phone [id number]- [place] Stinson  [address] [facility] AT [address] [place] [address] [phone] Your Updated Medication List  
  
   
This list is accurate as of 18  4:15 PM.  Always use your most recent med list.  
  
  
  
  
 folic acid 171 mcg tablet Take 400 mcg by mouth daily. Lancets Misc Commonly known as:  ONETOUCH ULTRASOFT LANCETS  
FOLLOW PACKAGE DIRECTIONS  
  
 multivitamin tablet Commonly known as:  ONE A DAY Take 1 Tab by mouth daily. OMEGA 3 FISH OIL PO Take  by mouth. sertraline 100 mg tablet Commonly known as:  ZOLOFT Take 2 tablets daily. VITAMIN D3 2,000 unit Tab Generic drug:  cholecalciferol (vitamin D3) Take  by mouth. Introducing [facility] & HEALTH SERVICES! Dear Afua Evans: Thank you for requesting a Spinlight Studio account. Our records indicate that you already have an active Spinlight Studio account. You can access your account anytime at [email or web address] Did you know that you can access your hospital and ER discharge instructions at any time in Spinlight Studio? You can also review all of your test results from your hospital stay or ER visit. Additional Information If you have questions, please visit the Frequently Asked Questions section of the Spinlight Studio website at [email or web address] Remember, Spinlight Studio is NOT to be used for urgent needs. For medical emergencies, dial 911. Now available from your iPhone and Android! Please provide this summary of care documentation to your next provider. Your primary care clinician is listed as Marilu Garner. If you have any questions after today's visit, please call [phone].

## 2018-07-27 NOTE — PROGRESS NOTES
Chief Complaint Patient presents with  Labs f/u Visit Vitals  /80 (BP 1 Location: Right arm, BP Patient Position: Sitting)  Pulse 93  Temp 99.2 °F (37.3 °C) (Oral)  Ht 5' 2\" (1.575 m)  Wt 149 lb 6.4 oz (67.8 kg)  SpO2 99%  BMI 27.33 kg/m2 1. Have you been to the ER, urgent care clinic since your last visit? Hospitalized since your last visit? No 
 
2. Have you seen or consulted any other health care providers outside of the [address] [place] [address] since your last visit? Include any pap smears or colon screening.  No

## 2018-10-17 DIAGNOSIS — F41.8 DEPRESSION WITH ANXIETY: ICD-10-CM

## 2018-10-17 RX ORDER — SERTRALINE HYDROCHLORIDE 100 MG/1
100 TABLET, FILM COATED ORAL DAILY
Qty: 90 TAB | Refills: 1 | Status: SHIPPED | OUTPATIENT
Start: 2018-10-17 | End: 2019-01-13 | Stop reason: SDUPTHER

## 2018-10-17 NOTE — TELEPHONE ENCOUNTER
Pt called stating that she is out of the medication.      Last refill:7/2/18  Last lab:7/18/18  Last Ov:7/27/18    Pharmacy:   [facility]

## 2019-01-13 DIAGNOSIS — F41.8 DEPRESSION WITH ANXIETY: ICD-10-CM

## 2019-01-13 RX ORDER — SERTRALINE HYDROCHLORIDE 100 MG/1
TABLET, FILM COATED ORAL
Qty: 90 TAB | Refills: 1 | Status: SHIPPED | OUTPATIENT
Start: 2019-01-13 | End: 2019-03-18 | Stop reason: SDUPTHER

## 2019-03-18 DIAGNOSIS — F41.8 DEPRESSION WITH ANXIETY: ICD-10-CM

## 2019-03-18 RX ORDER — SERTRALINE HYDROCHLORIDE 100 MG/1
TABLET, FILM COATED ORAL
Qty: 90 TAB | Refills: 1 | Status: SHIPPED | OUTPATIENT
Start: 2019-03-18 | End: 2019-06-24 | Stop reason: SDUPTHER

## 2019-06-24 DIAGNOSIS — F41.8 DEPRESSION WITH ANXIETY: ICD-10-CM

## 2019-06-24 RX ORDER — SERTRALINE HYDROCHLORIDE 100 MG/1
TABLET, FILM COATED ORAL
Qty: 90 TAB | Refills: 1 | Status: SHIPPED | OUTPATIENT
Start: 2019-06-24 | End: 2019-08-15 | Stop reason: SDUPTHER

## 2019-07-03 ENCOUNTER — OFFICE VISIT (OUTPATIENT)
Dept: PRIMARY CARE CLINIC | Age: 39
End: 2019-07-03

## 2019-07-03 VITALS
WEIGHT: 146.2 LBS | RESPIRATION RATE: 16 BRPM | SYSTOLIC BLOOD PRESSURE: 131 MMHG | OXYGEN SATURATION: 100 % | DIASTOLIC BLOOD PRESSURE: 86 MMHG | TEMPERATURE: 98.1 F | HEIGHT: 62 IN | HEART RATE: 101 BPM | BODY MASS INDEX: 26.91 KG/M2

## 2019-07-03 DIAGNOSIS — F41.9 ANXIETY AND DEPRESSION: ICD-10-CM

## 2019-07-03 DIAGNOSIS — G25.81 RESTLESS LEGS SYNDROME (RLS): ICD-10-CM

## 2019-07-03 DIAGNOSIS — E78.2 MIXED HYPERLIPIDEMIA: ICD-10-CM

## 2019-07-03 DIAGNOSIS — M54.9 PAIN, UPPER BACK: Primary | ICD-10-CM

## 2019-07-03 DIAGNOSIS — R22.0 SCALP MASS: ICD-10-CM

## 2019-07-03 DIAGNOSIS — E55.9 VITAMIN D DEFICIENCY: ICD-10-CM

## 2019-07-03 DIAGNOSIS — F32.A ANXIETY AND DEPRESSION: ICD-10-CM

## 2019-08-15 DIAGNOSIS — F41.8 DEPRESSION WITH ANXIETY: ICD-10-CM

## 2019-08-15 RX ORDER — SERTRALINE HYDROCHLORIDE 100 MG/1
TABLET, FILM COATED ORAL
Qty: 90 TAB | Refills: 1 | Status: SHIPPED | OUTPATIENT
Start: 2019-08-15 | End: 2020-03-01 | Stop reason: SDUPTHER

## 2019-10-22 ENCOUNTER — OFFICE VISIT (OUTPATIENT)
Dept: SURGERY | Age: 39
End: 2019-10-22

## 2019-10-22 DIAGNOSIS — L72.3 SEBACEOUS CYST: Primary | ICD-10-CM

## 2019-10-22 NOTE — PROGRESS NOTES
1. Have you been to the ER, urgent care clinic since your last visit? Hospitalized since your last visit? No    2. Have you seen or consulted any other health care providers outside of the [address] [place] [address] since your last visit? Include any pap smears or colon screening.  No

## 2019-10-22 NOTE — PROGRESS NOTES
HISTORY OF PRESENT ILLNESS  Jeremias Yost is a 41 y.o. female who is referred by Dr. Jane Vidales for further evaluation of a sebaceous cyst on her scalp. Ms. Petr Edward tells me that she has had a subcutaneous mass on her scalp for some time now. The mass has become progressively larger. No associated drainage or bleeding. However, Ms. Reid has been experiencing headaches. Found to have a sebaceous cyst.   She has otherwise been in her usual state of health. Past Medical History:  No date: Depression  6/16/2010: Diabetes mellitus (HCC)  No date: Hypercholesterolemia  No date: PCOD (polycystic ovarian disease)  10/22/2019: Sebaceous cyst  No date: Thyroid disease    Past Surgical History:  No date: HX HEENT      Comment:  tonsilectomy in 1998    Review of patient's family history indicates:  Problem: Diabetes      Relation: Mother          Age of Onset: (Not Specified)  Problem: Depression      Relation: Mother          Age of Onset: (Not Specified)  Problem: Heart Disease      Relation: Father          Age of Onset: (Not Specified)  Problem: Hypertension      Relation: Father          Age of Onset: (Not Specified)  Problem: Other      Relation: Brother          Age of Onset: (Not Specified)          Comment: sinus problem  Problem: Cancer      Relation: Maternal Grandmother          Age of Onset: (Not Specified)    Social History: Employment - Homemaker. Tobacco - Denies. EtOH - Denies. Review of systems negative except as noted. Review of Systems   HENT:        Sinus problems. Neurological: Positive for headaches. Psychiatric/Behavioral: The patient is nervous/anxious. Physical Exam   Constitutional: She appears well-developed and well-nourished. No distress. HENT:   Head: Normocephalic. Approx. 1.5cm x 1cm, well circumscribed, freely movable subcutaneous mass. No active infection. Clinically, this is c/w a sebaceous cyst.   Eyes: No scleral icterus.    Neck: Neck supple. Cardiovascular: Normal rate and regular rhythm. Pulmonary/Chest: Effort normal and breath sounds normal.   Abdominal: Soft. She exhibits no distension. There is no tenderness. Musculoskeletal: Normal range of motion. Lymphadenopathy:     She has no cervical adenopathy. Neurological: She is alert. Vitals reviewed. ASSESSMENT and PLAN  Explained to Ms. Reid that the mass on her scalp is c/w a sebaceous cyst. Discussed excision of the cyst with Ms. Reid including risks of bleeding, infection, recurrence, possibility that her headaches will not improve. At this point in time, she does not wish to proceed with surgery. Asked her to follow up with Dr. Yahaira Mendez as scheduled. Will see as needed.     CC: Marv Lopez MD

## 2020-03-01 DIAGNOSIS — F41.8 DEPRESSION WITH ANXIETY: ICD-10-CM

## 2020-03-01 RX ORDER — SERTRALINE HYDROCHLORIDE 100 MG/1
TABLET, FILM COATED ORAL
Qty: 90 TAB | Refills: 1 | Status: SHIPPED | OUTPATIENT
Start: 2020-03-01

## 2020-03-09 ENCOUNTER — OFFICE VISIT (OUTPATIENT)
Dept: BEHAVIORAL/MENTAL HEALTH CLINIC | Age: 40
End: 2020-03-09

## 2020-03-09 VITALS
HEART RATE: 89 BPM | HEIGHT: 62 IN | SYSTOLIC BLOOD PRESSURE: 133 MMHG | DIASTOLIC BLOOD PRESSURE: 88 MMHG | WEIGHT: 152 LBS | BODY MASS INDEX: 27.97 KG/M2

## 2020-03-09 DIAGNOSIS — G25.81 INSOMNIA DUE TO RESTLESS LEGS SYNDROME: ICD-10-CM

## 2020-03-09 DIAGNOSIS — G47.01 INSOMNIA DUE TO RESTLESS LEGS SYNDROME: ICD-10-CM

## 2020-03-09 DIAGNOSIS — F41.1 ANXIETY, GENERALIZED: Primary | ICD-10-CM

## 2020-03-09 DIAGNOSIS — Z86.2 HISTORY OF THROMBOCYTOPENIA: ICD-10-CM

## 2020-03-09 RX ORDER — GABAPENTIN 100 MG/1
100 CAPSULE ORAL
Qty: 30 CAP | Refills: 2 | Status: CANCELLED | OUTPATIENT
Start: 2020-03-09

## 2020-03-09 RX ORDER — GABAPENTIN 100 MG/1
100 CAPSULE ORAL
Qty: 30 CAP | Refills: 2 | Status: SHIPPED | OUTPATIENT
Start: 2020-03-09 | End: 2020-07-10 | Stop reason: ALTCHOICE
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
## 2020-03-10 LAB
BASOPHILS # BLD AUTO: 0.1 X10E3/UL (ref 0–0.2)
BASOPHILS NFR BLD AUTO: 1 %
EOSINOPHIL # BLD AUTO: 0.3 X10E3/UL (ref 0–0.4)
EOSINOPHIL NFR BLD AUTO: 4 %
ERYTHROCYTE [DISTWIDTH] IN BLOOD BY AUTOMATED COUNT: 12.9 % (ref 11.7–15.4)
HCT VFR BLD AUTO: 42.9 % (ref 34–46.6)
HGB BLD-MCNC: 14.6 G/DL (ref 11.1–15.9)
IMM GRANULOCYTES # BLD AUTO: 0 X10E3/UL (ref 0–0.1)
IMM GRANULOCYTES NFR BLD AUTO: 0 %
IRON SATN MFR SERPL: 36 % (ref 15–55)
IRON SERPL-MCNC: 104 UG/DL (ref 27–159)
LYMPHOCYTES # BLD AUTO: 2.2 X10E3/UL (ref 0.7–3.1)
LYMPHOCYTES NFR BLD AUTO: 34 %
MCH RBC QN AUTO: 28.4 PG (ref 26.6–33)
MCHC RBC AUTO-ENTMCNC: 34 G/DL (ref 31.5–35.7)
MCV RBC AUTO: 84 FL (ref 79–97)
MONOCYTES # BLD AUTO: 0.4 X10E3/UL (ref 0.1–0.9)
MONOCYTES NFR BLD AUTO: 6 %
NEUTROPHILS # BLD AUTO: 3.6 X10E3/UL (ref 1.4–7)
NEUTROPHILS NFR BLD AUTO: 55 %
PLATELET # BLD AUTO: 192 X10E3/UL (ref 150–450)
RBC # BLD AUTO: 5.14 X10E6/UL (ref 3.77–5.28)
TIBC SERPL-MCNC: 288 UG/DL (ref 250–450)
UIBC SERPL-MCNC: 184 UG/DL (ref 131–425)
WBC # BLD AUTO: 6.5 X10E3/UL (ref 3.4–10.8)

## 2020-04-20 NOTE — PROGRESS NOTES
Please ask her how is her scalp mass? She can do a virtual visit if she needs any refill or follow up.

## 2020-04-20 NOTE — PROGRESS NOTES
Call placed patient per Dr. Curtis Koehler request. Message left on voicemail for patient to return call to office

## 2020-07-10 ENCOUNTER — OFFICE VISIT (OUTPATIENT)
Dept: PRIMARY CARE CLINIC | Age: 40
End: 2020-07-10

## 2020-07-10 VITALS
HEIGHT: 62 IN | WEIGHT: 149 LBS | TEMPERATURE: 97.7 F | RESPIRATION RATE: 18 BRPM | SYSTOLIC BLOOD PRESSURE: 143 MMHG | BODY MASS INDEX: 27.42 KG/M2 | OXYGEN SATURATION: 100 % | HEART RATE: 84 BPM | DIASTOLIC BLOOD PRESSURE: 92 MMHG

## 2020-07-10 DIAGNOSIS — N92.6 IRREGULAR MENSTRUAL CYCLE: ICD-10-CM

## 2020-07-10 DIAGNOSIS — F32.89 OTHER DEPRESSION: ICD-10-CM

## 2020-07-10 DIAGNOSIS — I10 ESSENTIAL HYPERTENSION: Primary | ICD-10-CM

## 2020-07-10 DIAGNOSIS — R51.9 FREQUENT HEADACHES: ICD-10-CM

## 2020-07-10 RX ORDER — BISOPROLOL FUMARATE AND HYDROCHLOROTHIAZIDE 5; 6.25 MG/1; MG/1
1 TABLET ORAL DAILY
Qty: 30 TAB | Refills: 0 | Status: SHIPPED | OUTPATIENT
Start: 2020-07-10 | End: 2020-07-10 | Stop reason: SDUPTHER

## 2020-07-10 RX ORDER — BISOPROLOL FUMARATE AND HYDROCHLOROTHIAZIDE 5; 6.25 MG/1; MG/1
1 TABLET ORAL DAILY
Qty: 30 TAB | Refills: 0 | Status: SHIPPED | OUTPATIENT
Start: 2020-07-10 | End: 2020-09-03 | Stop reason: SDUPTHER

## 2020-07-10 NOTE — PROGRESS NOTES
Written by Martín Lynn, as dictated by Dr. Darren Hanson MD.    [facility]  Yeyo Suarez is a 42 y.o. female. HPI  Pt presents today for high BP. She has been having HA for the past week, and she was unsure of the cause, so she purchased a BP cuff. When she checked her BP, it was 150/111. Her BP is 143/93 in office today. She is feeling well on Zoloft 200 mg and follows with psychiatry. She took 1 tablet of gabapentin for restless leg syndrome but felt uncomfortable with it so she d/c it. She has been having her menstrual cycle only every 2 months, and she has not seen an GYN since she had her last child. She has been told in the past that one of her ovaries was not functioning properly. Patient Active Problem List   Diagnosis Code    PCOS (polycystic ovarian syndrome) E28.2    Vitamin D deficiency E55.9    Depression F32.9    Sebaceous cyst L72.3        Current Outpatient Medications on File Prior to Visit   Medication Sig Dispense Refill    sertraline (ZOLOFT) 100 mg tablet TAKE 1 TABLET BY MOUTH DAILY FOR 90 DAYS THEN 2 TABS DAILY 90 Tab 1    [DISCONTINUED] gabapentin (NEURONTIN) 100 mg capsule Take 1 Cap by mouth nightly. Max Daily Amount: 100 mg. 30 Cap 2    folic acid 002 mcg tablet Take 400 mcg by mouth daily.  OMEGA-3 FATTY ACIDS/FISH OIL (OMEGA 3 FISH OIL PO) Take  by mouth.  multivitamin (ONE A DAY) tablet Take 1 Tab by mouth daily.  cholecalciferol, vitamin D3, (VITAMIN D3) 2,000 unit Tab Take  by mouth.  Lancets (ONE TOUCH ULTRASOFT LANCETS) Misc FOLLOW PACKAGE DIRECTIONS 100 Each 2     No current facility-administered medications on file prior to visit.         No Known Allergies    Past Medical History:   Diagnosis Date    Depression     Diabetes mellitus ([facility] [address] 6/16/2010    Hypercholesterolemia     PCOD (polycystic ovarian disease)     Sebaceous cyst 10/22/2019    Thyroid disease        Past Surgical History: Procedure Laterality Date    HX HEENT      tonsilectomy in 1998       Family History   Problem Relation Age of Onset    Diabetes Mother     Depression Mother     Heart Disease Father     Hypertension Father     Other Brother         sinus problem    Cancer Maternal Grandmother        Social History     Socioeconomic History    Marital status:      Spouse name: Not on file    Number of children: Not on file    Years of education: Not on file    Highest education level: Not on file   Occupational History    Not on file   Social Needs    Financial resource strain: Not on file    Food insecurity     Worry: Not on file     Inability: Not on file    Transportation needs     Medical: Not on file     Non-medical: Not on file   Tobacco Use    Smoking status: Never Smoker    Smokeless tobacco: Never Used   Substance and Sexual Activity    Alcohol use: No    Drug use: No    Sexual activity: Not Currently     Comment:    Lifestyle    Physical activity     Days per week: Not on file     Minutes per session: Not on file    Stress: Not on file   Relationships    Social connections     Talks on phone: Not on file     Gets together: Not on file     Attends Faith service: Not on file     Active member of club or organization: Not on file     Attends meetings of clubs or organizations: Not on file     Relationship status: Not on file    Intimate partner violence     Fear of current or ex partner: Not on file     Emotionally abused: Not on file     Physically abused: Not on file     Forced sexual activity: Not on file   Other Topics Concern    Not on file   Social History Narrative    Not on file       No visits with results within 3 Month(s) from this visit.    Latest known visit with results is:   Office Visit on 03/09/2020   Component Date Value Ref Range Status    TIBC 03/09/2020 288  250 - 450 ug/dL Final    UIBC 03/09/2020 184  131 - 425 ug/dL Final    Iron 03/09/2020 104  27 - 159 ug/dL Final    Iron % saturation 03/09/2020 36  15 - 55 % Final    WBC 03/09/2020 6.5  3.4 - 10.8 x10E3/uL Final    RBC 03/09/2020 5.14  3.77 - 5.28 x10E6/uL Final    HGB 03/09/2020 14.6  11.1 - 15.9 g/dL Final    HCT 03/09/2020 42.9  34.0 - 46.6 % Final    MCV 03/09/2020 84  79 - 97 fL Final    MCH 03/09/2020 28.4  26.6 - 33.0 pg Final    MCHC 03/09/2020 34.0  31.5 - 35.7 g/dL Final    RDW 03/09/2020 12.9  11.7 - 15.4 % Final    PLATELET 94/87/7075 908  150 - 450 x10E3/uL Final    NEUTROPHILS 03/09/2020 55  Not Estab. % Final    Lymphocytes 03/09/2020 34  Not Estab. % Final    MONOCYTES 03/09/2020 6  Not Estab. % Final    EOSINOPHILS 03/09/2020 4  Not Estab. % Final    BASOPHILS 03/09/2020 1  Not Estab. % Final    ABS. NEUTROPHILS 03/09/2020 3.6  1.4 - 7.0 x10E3/uL Final    Abs Lymphocytes 03/09/2020 2.2  0.7 - 3.1 x10E3/uL Final    ABS. MONOCYTES 03/09/2020 0.4  0.1 - 0.9 x10E3/uL Final    ABS. EOSINOPHILS 03/09/2020 0.3  0.0 - 0.4 x10E3/uL Final    ABS. BASOPHILS 03/09/2020 0.1  0.0 - 0.2 x10E3/uL Final    IMMATURE GRANULOCYTES 03/09/2020 0  Not Estab. % Final    ABS. IMM. GRANS. 03/09/2020 0.0  0.0 - 0.1 x10E3/uL Final     Review of Systems   Constitutional: Negative for malaise/fatigue and weight loss. HENT: Negative for congestion and sore throat. Eyes: Negative for blurred vision. Respiratory: Negative for cough and shortness of breath. Cardiovascular: Negative for chest pain and leg swelling. Gastrointestinal: Negative for constipation and heartburn. Genitourinary: Negative for frequency and urgency. +irregular menstrual cycle   Musculoskeletal: Negative for back pain, joint pain and myalgias. Neurological: Positive for headaches. Negative for dizziness. Psychiatric/Behavioral: Negative for depression. The patient is not nervous/anxious and does not have insomnia.       Visit Vitals  BP (!) 143/92 (BP 1 Location: Left arm, BP Patient Position: Sitting)   Pulse 84 Temp 97.7 °F (36.5 °C) (Oral)   Resp 18   Ht 5' 2\" (1.575 m)   Wt 149 lb (67.6 kg)   LMP 07/01/2020 (Exact Date)   SpO2 100%   BMI 27.25 kg/m²     Physical Exam  Nursing note reviewed. Constitutional:       Appearance: Normal appearance. She is well-developed and well-groomed. HENT:      Head: Normocephalic and atraumatic. Nose: No congestion. Eyes:      General:         Right eye: No discharge. Left eye: No discharge. Pulmonary:      Effort: Pulmonary effort is normal.      Breath sounds: No wheezing. Neurological:      Mental Status: She is alert and oriented to person, place, and time. Psychiatric:         Mood and Affect: Mood normal.         Behavior: Behavior normal.       ASSESSMENT and PLAN    ICD-10-CM ICD-9-CM      1. Essential hypertension  I10 401.9 bisoprolol-hydroCHLOROthiazide (ZIAC) 5-6.25 mg per tablet sent to pharmacy. Potential side effects were discussed. I advised her to cut down on her salt intake and walk daily for exercise. I prescribed Dee Davenport for her to take daily for her BP, but I told he that if she can alter her lifestyle to manage her BP, we will d/c her medication eventually. I told her to schedule a physical exam in the morning in about 2 weeks so that I can re-evaluate her and check her labs. Instructed patient to keep a log and bring it to next appointment. 2. Other depression  F32.89 311 Stable, continues on Zoloft 100 mg daily . 3. Frequent headaches  R51 784.0 Will monitor for changes or improvements. 4. Irregular menstrual cycle  N92.6 626.4 I will check her TSH next time she comes to the office. I recommended that she begin seeing her OBGYN again and ask her about this as well. This has been an ongoing issue for her, originally attributed to PCOS. This plan was reviewed with the patient and patient agrees. All questions were answered.     This scribe documentation was reviewed by me and accurately reflects the examination and decisions made by me. This note will not be viewable in [address]

## 2020-09-03 DIAGNOSIS — I10 ESSENTIAL HYPERTENSION: ICD-10-CM

## 2020-09-03 RX ORDER — BISOPROLOL FUMARATE AND HYDROCHLOROTHIAZIDE 5; 6.25 MG/1; MG/1
1 TABLET ORAL DAILY
Qty: 90 TAB | Refills: 0 | Status: SHIPPED | OUTPATIENT
Start: 2020-09-03 | End: 2020-12-02

## 2020-09-07 DIAGNOSIS — F32.89 OTHER DEPRESSION: Primary | ICD-10-CM

## 2020-09-07 RX ORDER — SERTRALINE HYDROCHLORIDE 100 MG/1
100 TABLET, FILM COATED ORAL DAILY
Qty: 90 TAB | Refills: 0 | Status: SHIPPED | OUTPATIENT
Start: 2020-09-07 | End: 2020-12-06

## 2022-03-19 PROBLEM — L72.3 SEBACEOUS CYST: Status: ACTIVE | Noted: 2019-10-22

## 2023-05-12 RX ORDER — LANOLIN ALCOHOL/MO/W.PET/CERES
400 CREAM (GRAM) TOPICAL DAILY
COMMUNITY

## 2023-05-12 RX ORDER — SERTRALINE HYDROCHLORIDE 100 MG/1
TABLET, FILM COATED ORAL
COMMUNITY
Start: 2020-03-01
